# Patient Record
Sex: MALE
[De-identification: names, ages, dates, MRNs, and addresses within clinical notes are randomized per-mention and may not be internally consistent; named-entity substitution may affect disease eponyms.]

---

## 2018-01-06 ENCOUNTER — HOSPITAL ENCOUNTER (EMERGENCY)
Dept: HOSPITAL 90 - EDH | Age: 38
Discharge: HOME | End: 2018-01-06
Payer: SELF-PAY

## 2018-01-06 DIAGNOSIS — J45.901: Primary | ICD-10-CM

## 2018-01-06 PROCEDURE — 94640 AIRWAY INHALATION TREATMENT: CPT

## 2018-01-24 ENCOUNTER — EMERGENCY (EMERGENCY)
Facility: HOSPITAL | Age: 38
LOS: 1 days | Discharge: HOME | End: 2018-01-24
Attending: EMERGENCY MEDICINE
Payer: SELF-PAY

## 2018-01-24 VITALS
WEIGHT: 210.1 LBS | HEART RATE: 115 BPM | OXYGEN SATURATION: 99 % | SYSTOLIC BLOOD PRESSURE: 146 MMHG | RESPIRATION RATE: 20 BRPM | HEIGHT: 68 IN | DIASTOLIC BLOOD PRESSURE: 92 MMHG | TEMPERATURE: 99 F

## 2018-01-24 LAB
ALBUMIN SERPL ELPH-MCNC: 4.5 G/DL — SIGNIFICANT CHANGE UP (ref 3.3–5.2)
ALP SERPL-CCNC: 80 U/L — SIGNIFICANT CHANGE UP (ref 40–120)
ALT FLD-CCNC: 18 U/L — SIGNIFICANT CHANGE UP
ANION GAP SERPL CALC-SCNC: 15 MMOL/L — SIGNIFICANT CHANGE UP (ref 5–17)
AST SERPL-CCNC: 15 U/L — SIGNIFICANT CHANGE UP
BASOPHILS # BLD AUTO: 0 K/UL — SIGNIFICANT CHANGE UP (ref 0–0.2)
BASOPHILS NFR BLD AUTO: 0.6 % — SIGNIFICANT CHANGE UP (ref 0–2)
BILIRUB SERPL-MCNC: 0.5 MG/DL — SIGNIFICANT CHANGE UP (ref 0.4–2)
BUN SERPL-MCNC: 7 MG/DL — LOW (ref 8–20)
CALCIUM SERPL-MCNC: 9 MG/DL — SIGNIFICANT CHANGE UP (ref 8.6–10.2)
CHLORIDE SERPL-SCNC: 105 MMOL/L — SIGNIFICANT CHANGE UP (ref 98–107)
CO2 SERPL-SCNC: 22 MMOL/L — SIGNIFICANT CHANGE UP (ref 22–29)
CREAT SERPL-MCNC: 0.8 MG/DL — SIGNIFICANT CHANGE UP (ref 0.5–1.3)
EOSINOPHIL # BLD AUTO: 0.8 K/UL — HIGH (ref 0–0.5)
EOSINOPHIL NFR BLD AUTO: 9.2 % — HIGH (ref 0–5)
GLUCOSE SERPL-MCNC: 103 MG/DL — SIGNIFICANT CHANGE UP (ref 70–115)
HCT VFR BLD CALC: 43.7 % — SIGNIFICANT CHANGE UP (ref 42–52)
HGB BLD-MCNC: 15.6 G/DL — SIGNIFICANT CHANGE UP (ref 14–18)
LYMPHOCYTES # BLD AUTO: 0.9 K/UL — LOW (ref 1–4.8)
LYMPHOCYTES # BLD AUTO: 10.9 % — LOW (ref 20–55)
MCHC RBC-ENTMCNC: 32 PG — HIGH (ref 27–31)
MCHC RBC-ENTMCNC: 35.7 G/DL — SIGNIFICANT CHANGE UP (ref 32–36)
MCV RBC AUTO: 89.7 FL — SIGNIFICANT CHANGE UP (ref 80–94)
MONOCYTES # BLD AUTO: 1.2 K/UL — HIGH (ref 0–0.8)
MONOCYTES NFR BLD AUTO: 13.6 % — HIGH (ref 3–10)
NEUTROPHILS # BLD AUTO: 5.6 K/UL — SIGNIFICANT CHANGE UP (ref 1.8–8)
NEUTROPHILS NFR BLD AUTO: 65.1 % — SIGNIFICANT CHANGE UP (ref 37–73)
PLATELET # BLD AUTO: 273 K/UL — SIGNIFICANT CHANGE UP (ref 150–400)
POTASSIUM SERPL-MCNC: 3.5 MMOL/L — SIGNIFICANT CHANGE UP (ref 3.5–5.3)
POTASSIUM SERPL-SCNC: 3.5 MMOL/L — SIGNIFICANT CHANGE UP (ref 3.5–5.3)
PROT SERPL-MCNC: 7.2 G/DL — SIGNIFICANT CHANGE UP (ref 6.6–8.7)
RBC # BLD: 4.87 M/UL — SIGNIFICANT CHANGE UP (ref 4.6–6.2)
RBC # FLD: 12.6 % — SIGNIFICANT CHANGE UP (ref 11–15.6)
SODIUM SERPL-SCNC: 142 MMOL/L — SIGNIFICANT CHANGE UP (ref 135–145)
WBC # BLD: 8.6 K/UL — SIGNIFICANT CHANGE UP (ref 4.8–10.8)
WBC # FLD AUTO: 8.6 K/UL — SIGNIFICANT CHANGE UP (ref 4.8–10.8)

## 2018-01-24 PROCEDURE — 85027 COMPLETE CBC AUTOMATED: CPT

## 2018-01-24 PROCEDURE — 96374 THER/PROPH/DIAG INJ IV PUSH: CPT

## 2018-01-24 PROCEDURE — 71046 X-RAY EXAM CHEST 2 VIEWS: CPT | Mod: 26

## 2018-01-24 PROCEDURE — 99285 EMERGENCY DEPT VISIT HI MDM: CPT

## 2018-01-24 PROCEDURE — 36415 COLL VENOUS BLD VENIPUNCTURE: CPT

## 2018-01-24 PROCEDURE — 71046 X-RAY EXAM CHEST 2 VIEWS: CPT

## 2018-01-24 PROCEDURE — 94640 AIRWAY INHALATION TREATMENT: CPT

## 2018-01-24 PROCEDURE — 99284 EMERGENCY DEPT VISIT MOD MDM: CPT | Mod: 25

## 2018-01-24 PROCEDURE — 80053 COMPREHEN METABOLIC PANEL: CPT

## 2018-01-24 RX ORDER — AZITHROMYCIN 500 MG/1
1 TABLET, FILM COATED ORAL
Qty: 1 | Refills: 0 | OUTPATIENT
Start: 2018-01-24 | End: 2018-01-24

## 2018-01-24 RX ORDER — IPRATROPIUM/ALBUTEROL SULFATE 18-103MCG
3 AEROSOL WITH ADAPTER (GRAM) INHALATION ONCE
Qty: 0 | Refills: 0 | Status: COMPLETED | OUTPATIENT
Start: 2018-01-24 | End: 2018-01-24

## 2018-01-24 RX ADMIN — Medication 3 MILLILITER(S): at 15:36

## 2018-01-24 RX ADMIN — Medication 125 MILLIGRAM(S): at 15:36

## 2018-01-24 NOTE — ED PROVIDER NOTE - OBJECTIVE STATEMENT
36 y/o M pt with a pmhx of asthma presents to the ED c/o L lower back pain and SOB worsening since yesterday. Explains that he recently traveled to [place] from [place]. In the past he has had similar pain in his back. Currently using his rescue inhaler with no relief. EMS notes they gave the patient 2 combi tube Tx with mild relief. Reports he frequently get bronchitis. Has been placed on a Bipap machine in the past, has NEVER been intubated. Not taking routine medications at this time. Non smoker. No SHx. Denies fever, chills, burning on urination, hematuria, neck pain, blurred vision, loc, peripheral edema, headache, fever, chills, chest pain, sinus pressure, rash, n/v/d or any other complaints. NKDA.

## 2018-01-24 NOTE — ED PROVIDER NOTE - MEDICAL DECISION MAKING DETAILS
Mild asthma exacerbation, will give due nebs and steroids Tx. Mild asthma exacerbation, will give due nebs and steroids Tx. Reeval

## 2018-01-24 NOTE — ED ADULT NURSE NOTE - OBJECTIVE STATEMENT
pt states he started wheezing last night  took his inhaler but had no relief. pt reports his chest is tight . pt with  inspiratory and expiratory wheezing bilaterally on auscultation . pt denies fever

## 2018-01-24 NOTE — ED PROVIDER NOTE - PROGRESS NOTE DETAILS
PT feeling better.Wheezes improving w/ good air movement. I reviewed patient's results with patient and allowed the opportunity for questions.  I provided the patient with anticipatory guidance, advised followup with PCP, and gave return precautions. Patient reported that they were feeling well for discharge and expressed understanding of instructions.  Patient is well for discharge.

## 2018-01-25 RX ORDER — AZITHROMYCIN 500 MG/1
0.5 TABLET, FILM COATED ORAL
Qty: 2 | Refills: 0 | OUTPATIENT
Start: 2018-01-25 | End: 2018-01-28

## 2018-02-20 ENCOUNTER — EMERGENCY (EMERGENCY)
Facility: HOSPITAL | Age: 38
LOS: 1 days | Discharge: HOME | End: 2018-02-20
Attending: EMERGENCY MEDICINE | Admitting: EMERGENCY MEDICINE
Payer: SELF-PAY

## 2018-02-20 VITALS
HEART RATE: 95 BPM | OXYGEN SATURATION: 95 % | TEMPERATURE: 99 F | RESPIRATION RATE: 20 BRPM | SYSTOLIC BLOOD PRESSURE: 136 MMHG | DIASTOLIC BLOOD PRESSURE: 91 MMHG

## 2018-02-20 VITALS — HEIGHT: 68 IN | WEIGHT: 210.1 LBS

## 2018-02-20 PROCEDURE — 94640 AIRWAY INHALATION TREATMENT: CPT

## 2018-02-20 PROCEDURE — 99284 EMERGENCY DEPT VISIT MOD MDM: CPT

## 2018-02-20 PROCEDURE — 99284 EMERGENCY DEPT VISIT MOD MDM: CPT | Mod: 25

## 2018-02-20 RX ORDER — ALBUTEROL 90 UG/1
2 AEROSOL, METERED ORAL
Qty: 1 | Refills: 0 | OUTPATIENT
Start: 2018-02-20 | End: 2018-03-21

## 2018-02-20 RX ORDER — IPRATROPIUM/ALBUTEROL SULFATE 18-103MCG
3 AEROSOL WITH ADAPTER (GRAM) INHALATION ONCE
Qty: 0 | Refills: 0 | Status: COMPLETED | OUTPATIENT
Start: 2018-02-20 | End: 2018-02-20

## 2018-02-20 RX ORDER — ALBUTEROL 90 UG/1
2.5 AEROSOL, METERED ORAL ONCE
Qty: 0 | Refills: 0 | Status: DISCONTINUED | OUTPATIENT
Start: 2018-02-20 | End: 2018-02-20

## 2018-02-20 RX ADMIN — Medication 3 MILLILITER(S): at 12:50

## 2018-02-20 RX ADMIN — Medication 3 MILLILITER(S): at 11:54

## 2018-02-20 NOTE — ED PROVIDER NOTE - OBJECTIVE STATEMENT
ASTHMA EXACERBATION. STATES TIGHT FOR 4 DAYS  RAN OUT OF NEBULIZER. NON SMOKER. NO FEVER /CHILLS. MIN COUGH OCC CLEAR PHLEGM.  NO OTHER SYMPTOMS  MED HX ASTHMA

## 2018-02-20 NOTE — ED ADULT TRIAGE NOTE - CHIEF COMPLAINT QUOTE
"I have asthma and I have been using my inhaler more frequently without relief, I think I might have bronchitis."

## 2018-02-22 ENCOUNTER — EMERGENCY (EMERGENCY)
Facility: HOSPITAL | Age: 38
LOS: 1 days | Discharge: HOME | End: 2018-02-22
Attending: EMERGENCY MEDICINE | Admitting: EMERGENCY MEDICINE
Payer: SELF-PAY

## 2018-02-22 VITALS
TEMPERATURE: 98 F | DIASTOLIC BLOOD PRESSURE: 83 MMHG | OXYGEN SATURATION: 93 % | SYSTOLIC BLOOD PRESSURE: 128 MMHG | HEART RATE: 106 BPM | RESPIRATION RATE: 20 BRPM

## 2018-02-22 VITALS
HEART RATE: 109 BPM | RESPIRATION RATE: 22 BRPM | TEMPERATURE: 98 F | DIASTOLIC BLOOD PRESSURE: 96 MMHG | SYSTOLIC BLOOD PRESSURE: 134 MMHG | WEIGHT: 210.1 LBS | OXYGEN SATURATION: 99 % | HEIGHT: 68 IN

## 2018-02-22 PROCEDURE — 71046 X-RAY EXAM CHEST 2 VIEWS: CPT

## 2018-02-22 PROCEDURE — 99284 EMERGENCY DEPT VISIT MOD MDM: CPT | Mod: 25

## 2018-02-22 PROCEDURE — 94640 AIRWAY INHALATION TREATMENT: CPT

## 2018-02-22 PROCEDURE — 71046 X-RAY EXAM CHEST 2 VIEWS: CPT | Mod: 26

## 2018-02-22 PROCEDURE — 99053 MED SERV 10PM-8AM 24 HR FAC: CPT

## 2018-02-22 PROCEDURE — 96374 THER/PROPH/DIAG INJ IV PUSH: CPT

## 2018-02-22 RX ORDER — IPRATROPIUM/ALBUTEROL SULFATE 18-103MCG
3 AEROSOL WITH ADAPTER (GRAM) INHALATION ONCE
Qty: 0 | Refills: 0 | Status: COMPLETED | OUTPATIENT
Start: 2018-02-22 | End: 2018-02-22

## 2018-02-22 RX ORDER — FLUTICASONE PROPIONATE AND SALMETEROL 50; 250 UG/1; UG/1
1 POWDER ORAL; RESPIRATORY (INHALATION)
Qty: 1 | Refills: 0 | OUTPATIENT
Start: 2018-02-22 | End: 2018-03-23

## 2018-02-22 RX ORDER — MAGNESIUM SULFATE 500 MG/ML
2 VIAL (ML) INJECTION ONCE
Qty: 0 | Refills: 0 | Status: COMPLETED | OUTPATIENT
Start: 2018-02-22 | End: 2018-02-22

## 2018-02-22 RX ORDER — ALBUTEROL 90 UG/1
2.5 AEROSOL, METERED ORAL ONCE
Qty: 0 | Refills: 0 | Status: COMPLETED | OUTPATIENT
Start: 2018-02-22 | End: 2018-02-22

## 2018-02-22 RX ORDER — ALBUTEROL 90 UG/1
2 AEROSOL, METERED ORAL
Qty: 1 | Refills: 0 | OUTPATIENT
Start: 2018-02-22 | End: 2018-03-23

## 2018-02-22 RX ADMIN — Medication 3 MILLILITER(S): at 12:02

## 2018-02-22 RX ADMIN — Medication 3 MILLILITER(S): at 06:49

## 2018-02-22 RX ADMIN — Medication 50 GRAM(S): at 06:48

## 2018-02-22 RX ADMIN — ALBUTEROL 2.5 MILLIGRAM(S): 90 AEROSOL, METERED ORAL at 06:49

## 2018-02-22 NOTE — ED ADULT TRIAGE NOTE - CHIEF COMPLAINT QUOTE
Pt with worsening SOB over the last two days, pt with audible wheezing upon arrival, hx of asthma with no relief from neb tx's/inhalers at home, pt rec'd 125mg solumedrol and 2 combi tx's prior to arrival

## 2018-02-22 NOTE — ED ADULT NURSE NOTE - CHPI ED SYMPTOMS NEG
no headache/no diaphoresis/no chills/no hemoptysis/no body aches/no chest pain/no cough/no wheezing/no edema/no fever/no shortness of breath

## 2018-02-22 NOTE — ED ADULT NURSE NOTE - OBJECTIVE STATEMENT
Patient states that he has a hx of asthma and bronchitis and has been having increased SOB over the last two days, patient states that he feels like he was having chest tightness at home but has run out of his inhaler. Patient is calm and comfortable at this time and speaking in full clear sentences. NAD noted. Denies any pain

## 2018-02-22 NOTE — ED PROVIDER NOTE - OBJECTIVE STATEMENT
37 year old male with a h/o asthma presents with c/o sob and cough. pt has a h/o asthma since childhood and has been hospitalized in the past but has never been intubated. He received 2 combivent treatments and solumedrol en route and one nebulizer treatment in the critical area as well as Magnesium sulfat2

## 2019-05-08 NOTE — ED ADULT NURSE NOTE - OBJECTIVE STATEMENT
----- Message from Haris Tinoco MD sent at 5/8/2019  6:55 AM CDT -----  Please call patient.  Stable labs.  Continues to have elevated triglycerides needs to cut down on carbohydrates. continue present management.  Thanks  
pt presents to ED with c/o of wheezing and SOB on exertion. pt states he has hx of asthma. pt states he has been using his inhaler with no relief. breathing si even and unlabored. a&ox3. will continue to monitor and reassess

## 2021-12-20 NOTE — ED ADULT NURSE NOTE - NSSISCREENINGQ4_ED_A_ED
Pulmonary Critical Care   Consult Note    Patient - Nancy Ortiz  Date of Admission -  2021 11:39 AM  Date of Evaluation -  2021  Room and Bed Number -  3026/3026-01   Hospital Day - 7    CHIEF COMPLAINT : ACUTE HYPOXIC RESPIRATORY FAILURE DUE TO COVID -19 PNEUMONIA   HPI:   Nancy Ortiz  63 y.o. male  admitted for COVID-19 at [facility] ER due to worsening oxygenation he was initially started on BiPAP and subsequently intubated. On room air his saturations had been 50%. CTA no PE but bilateral pulmonary infiltrates. He was accepted at [address] [place] [address] ICU. On arrival he is on PEEP of 22, 100% FiO2.    2021   Subjective      Some improvement with proning. FiO2 down to 65%. Remains on cis-atacarium, fentanyl, ketamine, midazolam, and dexmedetomidine. No sedation/neuromuscular blocker holiday. I/O- 2.7 L.   SECRETIONS  -Amount:  [x] Small [] Moderate  [] Large    [] None  Color:     [x] White [] Colored  [] Bloody    SEDATION:    [x] Propofol gtt  [x] Versed gtt  [x] FENTANYL  gtt  gtt   [] No Sedation    PARALYZED:  [] No    [x] Yes    VASOPRESSORS:  [x] No    [] Yes  [] Levophed [] Dopamine [] Vasopressin  [] Dobutamine [] Phenylephrine [] Epinephrine    INHALED veletri  : [] No    [x] Yes started 21    PRONE :       [] No    [x] Yes    Actemra:             [] No    [x] Yes  21  DEXAMETHASONE : [] No    [x] Yes      Ros unable to perform due to intubation and sedation    OBJECTIVE:     VITAL SIGNS:  BP (!) 150/73   Pulse 114   Temp 101.5 °F (38.6 °C) (Bladder)   Resp 30   Ht 6' 2\" (1.88 m)   Wt 266 lb 8.6 oz (120.9 kg)   SpO2 98%   BMI 34.22 kg/m²   Tmax over 24 hours:  Temp (24hrs), Av.4 °F (39.1 °C), Min:101.5 °F (38.6 °C), Max:102.9 °F (39.4 °C)      Patient Vitals for the past 8 hrs:   BP Temp Temp src Pulse Resp SpO2   21 2300 (!) 150/73 101.5 °F (38.6 °C) Bladder 114 30 98 %   21 2200 115/70 102.6 °F (39.2 °C) Bladder 70 30 96 %   21 2032 -- -- -- 70 30 97 %   12/19/21 2000 (!) 113/58 102.4 °F (39.1 °C) Bladder 69 14 97 %   12/19/21 1800 -- -- -- 69 30 98 %   12/19/21 1700 -- -- -- 71 30 92 %   12/19/21 1600 -- -- Bladder 108 24 96 %         Intake/Output Summary (Last 24 hours) at 12/19/2021 2347  Last data filed at 12/19/2021 2000  Gross per 24 hour   Intake 2150.81 ml   Output 3065 ml   Net -914.19 ml     Date 12/19/21 0000 - 12/19/21 2359   Shift 2378-7846 9811-5025 7887-4970 24 Hour Total   INTAKE   I.V.(mL/kg) 356.2(2.9)  922. 3(7.6) 1278. 6(10.6)   NG/GT(mL/kg) 281(2.3) 230(1.9) 260(2.2) 771(6.4)   IV Piggyback(mL/kg)   101.3(0.8) 101.3(0.8)   Shift Total(mL/kg) 637. 2(5.3) 230(1.9) 1283. 6(10.6) 2150. 8(17.8)   OUTPUT   Urine(mL/kg/hr) 715(0.7) 1200(1.2) 1150 3065   Shift Total(mL/kg) 715(5.9) 1200(9.9) 1150(9.5) 3065(25.4)   Weight (kg) 120.9 120.9 120.9 120.9     Wt Readings from Last 3 Encounters:   12/19/21 266 lb 8.6 oz (120.9 kg)   12/11/21 300 lb (136.1 kg)   12/12/19 (!) 355 lb 9.6 oz (161.3 kg)     Body mass index is 34.22 kg/m². PHYSICAL EXAM:      I have discussed the care of Chris Fu, including pertinent history and exam findings, with the resident/APC/staff. I have seen the patient and the key elements of all parts of the encounter have been performed by me. Physical exam was deferred to limit physical exposure and PPE resources. I reviewed the interval history, interpreted all available radiographic, laboratory and physiologic data at the time of service. I agree with the assessment and plan as documented by resident/APC/ ancillary staff.        MEDICATIONS:  Scheduled Meds:   lansoprazole  30 mg Oral QAM AC    cefepime  2,000 mg IntraVENous Q12H    furosemide  40 mg IntraVENous Daily    insulin glargine  10 Units SubCUTAneous Nightly    sodium chloride flush  5-40 mL IntraVENous 2 times per day    enoxaparin  30 mg SubCUTAneous BID    Vitamin D  2,000 Units Oral Daily    dexamethasone  10 mg IntraVENous Q24H    insulin lispro  0-6 Units SubCUTAneous Q4H     Continuous Infusions:   dexmedetomidine 0.8 mcg/kg/hr (12/19/21 1935)    ketamine (KETALAR) infusion for analgosedation 0.2 mg/kg/hr (12/19/21 0552)    sodium chloride      dextrose      norepinephrine Stopped (12/12/21 1149)    cisatracurium (NIMBEX) infusion 2.5 mcg/kg/min (12/19/21 1400)    midazolam 10 mg/hr (12/19/21 1246)    fentaNYL 200 mcg/hr (12/19/21 2245)    dextrose      sodium chloride 10 mL/hr at 12/13/21 1548     PRN Meds:   polyethylene glycol, 17 g, BID PRN  senna, 5 mL, Nightly PRN  docusate, 100 mg, BID PRN  bisacodyl, 10 mg, Daily PRN  acetaminophen, 650 mg, Q4H PRN  sodium chloride flush, 5-40 mL, PRN  sodium chloride, 25 mL, PRN  ondansetron, 4 mg, Q8H PRN   Or  ondansetron, 4 mg, Q6H PRN  acetaminophen, 650 mg, Q6H PRN  glucose, 15 g, PRN  dextrose, 12.5 g, PRN  glucagon (rDNA), 1 mg, PRN  dextrose, 100 mL/hr, PRN  glucose, 15 g, PRN  dextrose, 12.5 g, PRN  glucagon (rDNA), 1 mg, PRN  dextrose, 100 mL/hr, PRN        SUPPORT DEVICES: [x] Ventilator [] BIPAP  [] Nasal Cannula [] Room Air      ABGs:     Lab Results   Component Value Date    [id number] NOT REPORTED 12/19/2021    FIO2 65.0 12/19/2021       DATA:  Complete Blood Count:   Recent Labs     12/17/21  0415 12/18/21  0510 12/19/21  0504   WBC 10.5 13.5* 17.0*   RBC 4.98 4.97 4.98   HGB 15.4 15.4 15.5   HCT 48.4 48.3 47.6   MCV 97.2 97.2 95.6   MCH 30.9 31.0 31.1   MCHC 31.8 31.9 32.6   RDW 14.2 14.1 13.7    189 191   MPV 11.2 10.7 11.4        Last 3 Blood Glucose:   Recent Labs     12/17/21  0415 12/18/21  0510 12/19/21  0504   GLUCOSE 152* 117* 131*        PT/INR:    Lab Results   Component Value Date    PROTIME 13.0 12/12/2021    INR 1.0 12/12/2021     PTT:    Lab Results   Component Value Date    APTT 39.1 12/12/2021       Comprehensive Metabolic Profile:   Recent Labs     12/17/21  0415 12/17/21  0820 12/18/21  0510 12/18/21  0510 12/18/21  0946 12/18/21  1535 12/19/21  0504   *   < > 146*   < > 145* 143 142   K 4.9  --  4.6  --   --   --  4.7   *  --  108*  --   --   --  104   CO2 26  --  27  --   --   --  26   BUN 50*  --  42*  --   --   --  30*   CREATININE 0.69*  --  0.53*  --   --   --  0.45*   GLUCOSE 152*  --  117*  --   --   --  131*   CALCIUM 8.4*  --  8.6  --   --   --  8.4*   PROT 5.7*  --   --   --   --   --  5.5*   LABALBU 3.2*  3.2*  --   --   --   --   --  3.1*   BILITOT 0.36  --   --   --   --   --  0.57   ALKPHOS 97  --   --   --   --   --  74   AST 57*  --   --   --   --   --  31   *  --   --   --   --   --  93*    < > = values in this interval not displayed. Magnesium:   Lab Results   Component Value Date    MG 2.5 12/17/2021    MG 2.3 12/16/2021    MG 2.4 12/15/2021     Phosphorus:   Lab Results   Component Value Date    PHOS 3.2 12/18/2021    PHOS 4.0 12/17/2021    PHOS 2.8 12/16/2021     Ionized Calcium: No results found for: CAION     Urinalysis:   Lab Results   Component Value Date    NITRU NEGATIVE 12/18/2021    COLORU Dark Yellow 12/18/2021    PHUR 6.0 12/18/2021    WBCUA 5 TO 10 12/18/2021    RBCUA 20 TO 50 12/18/2021    MUCUS NOT REPORTED 12/18/2021    TRICHOMONAS NOT REPORTED 12/18/2021    YEAST NOT REPORTED 12/18/2021    BACTERIA NOT REPORTED 12/18/2021    SPECGRAV 1.032 12/18/2021    LEUKOCYTESUR NEGATIVE 12/18/2021    UROBILINOGEN Normal 12/18/2021    BILIRUBINUR NEGATIVE 12/18/2021    GLUCOSEU NEGATIVE 12/18/2021    KETUA NEGATIVE 12/18/2021    AMORPHOUS NOT REPORTED 12/18/2021           XR CHEST (SINGLE VIEW FRONTAL)    Result Date: 12/14/2021  Mild interval improvement in multifocal airspace disease particularly at the right base. Support tubes and lines as above. XR CHEST (SINGLE VIEW FRONTAL)    Result Date: 12/12/2021  Stable appearing     XR CHEST (SINGLE VIEW FRONTAL)    Result Date: 12/11/2021  Multifocal hazy opacities throughout both lungs consistent with COVID pneumonia and or pulmonary edema. XR CHEST PORTABLE    Result Date: 12/12/2021  Stable appearing chest with unchanged support tubes/lines and persistent extensive bilateral airspace disease consistent with known COVID pneumonia. XR CHEST PORTABLE    Result Date: 12/12/2021  Right internal jugular central venous catheter tip projecting over the proximal SVC versus brachiocephalic vein. No pneumothorax. Otherwise stable exam from earlier today. XR CHEST PORTABLE    Result Date: 12/12/2021  Endotracheal tube tip is 3.2 cm above the saul. Overall significant worsening of multifocal airspace opacities keeping with history of COVID-19. CT CHEST PULMONARY EMBOLISM W CONTRAST    Result Date: 12/11/2021  1. No evidence of pulmonary embolism 2. Diffuse ground-glass opacities throughout the lungs, typical of COVID-19 pulmonary disease. Past Medical History:   Diagnosis Date    Arthritis     Asthma     Diabetes mellitus ([address]     Edema     GERD (gastroesophageal reflux disease)     Hypertension     on lasix    Migraine     IRMA on CPAP     seldom use machine        Social History     Socioeconomic History    Marital status:      Spouse name: None    Number of children: None    Years of education: None    Highest education level: None   Occupational History    None   Tobacco Use    Smoking status: Former Smoker    Smokeless tobacco: Never Used   Vaping Use    Vaping Use: Never used   Substance and Sexual Activity    Alcohol use: Yes     Comment: every couple months    Drug use: Never    Sexual activity: None   Other Topics Concern    None   Social History Narrative    None     Social Determinants of Health     Financial Resource Strain:     Difficulty of Paying Living Expenses: Not on file   Food Insecurity:     Worried About Running Out of Food in the Last Year: Not on file    Lucina of Food in the Last Year: Not on file   Transportation Needs:     Lack of Transportation (Medical):  Not on file    Lack of Transportation (Non-Medical): Not on file   Physical Activity:     Days of Exercise per Week: Not on file    Minutes of Exercise per Session: Not on file   Stress:     Feeling of Stress : Not on file   Social Connections:     Frequency of Communication with Friends and Family: Not on file    Frequency of Social Gatherings with Friends and Family: Not on file    Attends Gnosticism Services: Not on file    Active Member of [address] [place] [address] or Organizations: Not on file    Attends Club or Organization Meetings: Not on file    Marital Status: Not on file   Intimate Partner Violence:     Fear of Current or Ex-Partner: Not on file    Emotionally Abused: Not on file    Physically Abused: Not on file    Sexually Abused: Not on file   Housing Stability:     Unable to Pay for Housing in the Last Year: Not on file    Number of [place] in the Last Year: Not on file    Unstable Housing in the Last Year: Not on file         There is no immunization history on file for this patient.       Family History   Problem Relation Age of Onset    Heart Attack Sister 32    Diabetes Paternal Grandmother     Heart Disease Paternal Uncle     Heart Disease Paternal Uncle     Heart Disease Paternal Uncle     Cancer Maternal Aunt     Cancer Maternal Aunt     Cancer Maternal Uncle     Cancer Maternal Uncle          Past Surgical History:   Procedure Laterality Date    APPENDECTOMY      ARTHROPLASTY Left 11/1/2019    LEFT FOOT 1ST MTPJ ARTHROPLASTY WITH PEREZ IMPLANT AND GROMMETS  [facility] performed by Jean-Pierre Casarez MD at [address] [facility] Right 12/12/2019    RIGHT FOOT ARTHROPLASTY 1ST MTPJ (Right Foot)    ARTHROPLASTY Right 12/12/2019    RIGHT FOOT ARTHROPLASTY 1ST MTPJ performed by Jean-Pierre Casarez MD at [address] Right    [address]  2014    no blockage no stents    CHOLECYSTECTOMY      COLONOSCOPY      ENDOSCOPY, COLON, DIAGNOSTIC      TOE SURGERY Left 11/01/2019 LEFT FOOT 1ST MTPJ ARTHROPLASTY WITH PEREZ IMPLANT AND GROMMETS  [facility] (Left )    TONSILLECTOMY            VENTILATOR SETTINGS:  Vent Information  $Ventilation: $Subsequent Day  Skin Assessment: Clean, dry, & intact  Suction Catheter Diameter: 14  Equipment ID: [id number]  Equipment Changed: Expiratory Filter (humidification, inspitaory filter)  Vent Type: Servo i  Vent Mode: PRVC  Vt Ordered: 500 mL  Rate Set: 30 bmp  FiO2 : 65 %  SpO2: 98 %  SpO2/FiO2 ratio: 149.23  Sensitivity: 3  PEEP/CPAP: 12  I Time/ I Time %: 0.6 s  Humidification Source: Heated wire  Humidification Temp: 37  Humidification Temp Measured: 37  Circuit Condensation: Drained  Nitric Oxide/Epoprostenol In Use?: No     PaO2/FiO2 RATIO:  Recent Labs     12/19/21  0457   POCPO2 54.2*      FiO2 : 65 %       LABS:  ABGs:   Recent Labs     12/17/21  0615 12/18/21  0430 12/19/21  0457   POCPH 7.359 7.445 7.484*   POCPCO2 54.1* 42.7 40.3   POCPO2 117.0* 45.1* 54.2*   POCHCO3 30.5* 29.3* 30.3*   [id number] 98 82* 90*            ASSESSMENT:     Principal Problem:    Pneumonia due to COVID-19 virus  Active Problems:    Acute respiratory failure with hypoxia (HCC)    Diabetes mellitus (HCC)    Asthma    IRMA on CPAP    Hypertension    GERD (gastroesophageal reflux disease)    ARDS (adult respiratory distress syndrome) (HCC)  Resolved Problems:    * No resolved hospital problems. *              Acute hypoxic respiratory failure secondary to COVID 19   Acute respiratory distress syndrome   Bilateral multifocal pneumonia due to COVID 19 infection   Covid -19 pandemic emergency    Hypernatremia     LOS: 7  PLAN:    · D/w RT  · D/w RN   · Sedation reviewed   · Discontinue epoprostenol. · Cont ARDS ventilation   ·  prone positioning - continue if helping.   · Airborne isolation and droplet precautions to be continued  · Continue supportive care   · Cont treatment with medications for COVID  · Cont tube feeding  · Monitor endotracheal secretions · Will obtain xray chest   · ABG  · Prognosis guarded given age and severity of illness. · Neuromuscular holiday when supine. Also sedation holiday. Treatment plan Discussed with nursing staff in detail , all questions answered . Total critical care time caring for this patient with life threatening, unstable organ failure, including direct patient contact, management of life support systems, review of data including imaging and labs, discussions with other team members and physicians at least 27  Min so far today, excluding procedures. Electronically signed by Ezell Alpers, DO on 12/19/2021 at 11:47 PM       This patient was evaluated in the context of the global SARS-CoV-2 (COVID-19) pandemic, which necessitated considerations that the patient either has COVID-19 infection or is at risk of infection with COVID-19. Institutional protocols and algorithms that pertain to the evaluation & management of patients with COVID-19 or those at risk for COVID-19 are in a state of rapid changes based on information released by regulatory bodies including the CDC and federal and state organizations. These policies and algorithms were followed during the patient's care. Please note that this chart was generated using voice recognition Dragon dictation software. Although every effort was made to ensure the accuracy of this automated transcription, some errors in transcription may have occurred. No

## 2023-08-24 NOTE — ED ADULT NURSE NOTE - CHEST APPEARANCE
Problem: Adult Inpatient Plan of Care  Goal: Plan of Care Review  Outcome: Ongoing, Progressing  Goal: Patient-Specific Goal (Individualized)  Outcome: Ongoing, Progressing  Goal: Absence of Hospital-Acquired Illness or Injury  Outcome: Ongoing, Progressing  Goal: Optimal Comfort and Wellbeing  Outcome: Ongoing, Progressing  Goal: Readiness for Transition of Care  Outcome: Ongoing, Progressing     Problem: Fluid Imbalance (Pneumonia)  Goal: Fluid Balance  Outcome: Ongoing, Progressing     Problem: Infection (Pneumonia)  Goal: Resolution of Infection Signs and Symptoms  Outcome: Ongoing, Progressing     Problem: Respiratory Compromise (Pneumonia)  Goal: Effective Oxygenation and Ventilation  Outcome: Ongoing, Progressing     Problem: Fall Injury Risk  Goal: Absence of Fall and Fall-Related Injury  Outcome: Ongoing, Progressing     Problem: Skin Injury Risk Increased  Goal: Skin Health and Integrity  Outcome: Ongoing, Progressing      normal

## 2024-04-05 NOTE — ED ADULT TRIAGE NOTE - HEIGHT IN INCHES
[facility] - Surgery (Hospital)  Discharge Note  Short Stay    Procedure(s) (LRB):  ARTHROPLASTY, FINGER (Right) thumb basal joint using Arthrex internal brace system  RELEASE, TRIGGER FINGER (Right) thumb      OUTCOME: Patient tolerated treatment/procedure well without complication and is now ready for discharge.    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    FOLLOWUP: In clinic    DISCHARGE INSTRUCTIONS:    Discharge Procedure Orders   Diet general     Call MD for:  temperature >100.4     Call MD for:  persistent nausea and vomiting     Call MD for:  severe uncontrolled pain     Call MD for:  difficulty breathing, headache or visual disturbances     Call MD for:  redness, tenderness, or signs of infection (pain, swelling, redness, odor or green/yellow discharge around incision site)     Call MD for:  hives     Call MD for:  persistent dizziness or light-headedness     Call MD for:  extreme fatigue        TIME SPENT ON DISCHARGE:  20 minutes   8
